# Patient Record
Sex: MALE | Race: WHITE | NOT HISPANIC OR LATINO | Employment: FULL TIME | URBAN - METROPOLITAN AREA
[De-identification: names, ages, dates, MRNs, and addresses within clinical notes are randomized per-mention and may not be internally consistent; named-entity substitution may affect disease eponyms.]

---

## 2022-08-16 ENCOUNTER — APPOINTMENT (OUTPATIENT)
Dept: CT IMAGING | Facility: CLINIC | Age: 57
End: 2022-08-16
Attending: FAMILY MEDICINE
Payer: COMMERCIAL

## 2022-08-16 ENCOUNTER — HOSPITAL ENCOUNTER (EMERGENCY)
Facility: CLINIC | Age: 57
Discharge: HOME | End: 2022-08-17
Attending: FAMILY MEDICINE | Admitting: EMERGENCY MEDICINE
Payer: COMMERCIAL

## 2022-08-16 DIAGNOSIS — R93.89 ABNORMAL CT SCAN: ICD-10-CM

## 2022-08-16 DIAGNOSIS — J69.0 ASPIRATION PNEUMONITIS (H): ICD-10-CM

## 2022-08-16 LAB
ALBUMIN SERPL-MCNC: 3.7 G/DL (ref 3.4–5)
ALBUMIN UR-MCNC: NEGATIVE MG/DL
ALP SERPL-CCNC: 77 U/L (ref 40–150)
ALT SERPL W P-5'-P-CCNC: 23 U/L (ref 0–70)
ANION GAP SERPL CALCULATED.3IONS-SCNC: 6 MMOL/L (ref 3–14)
APPEARANCE UR: CLEAR
AST SERPL W P-5'-P-CCNC: 21 U/L (ref 0–45)
BASE EXCESS BLDV CALC-SCNC: 0.7 MMOL/L (ref -7.7–1.9)
BASOPHILS # BLD AUTO: 0 10E3/UL (ref 0–0.2)
BASOPHILS NFR BLD AUTO: 0 %
BILIRUB SERPL-MCNC: 0.6 MG/DL (ref 0.2–1.3)
BILIRUB UR QL STRIP: NEGATIVE
BUN SERPL-MCNC: 14 MG/DL (ref 7–30)
CALCIUM SERPL-MCNC: 8.9 MG/DL (ref 8.5–10.1)
CHLORIDE BLD-SCNC: 108 MMOL/L (ref 94–109)
CO2 SERPL-SCNC: 25 MMOL/L (ref 20–32)
COLOR UR AUTO: YELLOW
CREAT SERPL-MCNC: 1.04 MG/DL (ref 0.66–1.25)
EOSINOPHIL # BLD AUTO: 0.1 10E3/UL (ref 0–0.7)
EOSINOPHIL NFR BLD AUTO: 1 %
ERYTHROCYTE [DISTWIDTH] IN BLOOD BY AUTOMATED COUNT: 16.5 % (ref 10–15)
FLUAV RNA SPEC QL NAA+PROBE: NEGATIVE
FLUBV RNA RESP QL NAA+PROBE: NEGATIVE
GFR SERPL CREATININE-BSD FRML MDRD: 83 ML/MIN/1.73M2
GLUCOSE BLD-MCNC: 105 MG/DL (ref 70–99)
GLUCOSE UR STRIP-MCNC: NEGATIVE MG/DL
HCO3 BLDV-SCNC: 24 MMOL/L (ref 21–28)
HCT VFR BLD AUTO: 39.8 % (ref 40–53)
HGB BLD-MCNC: 12.9 G/DL (ref 13.3–17.7)
HGB UR QL STRIP: NEGATIVE
IMM GRANULOCYTES # BLD: 0 10E3/UL
IMM GRANULOCYTES NFR BLD: 0 %
KETONES UR STRIP-MCNC: NEGATIVE MG/DL
LACTATE SERPL-SCNC: 1.7 MMOL/L (ref 0.7–2)
LEUKOCYTE ESTERASE UR QL STRIP: NEGATIVE
LYMPHOCYTES # BLD AUTO: 0.5 10E3/UL (ref 0.8–5.3)
LYMPHOCYTES NFR BLD AUTO: 6 %
MCH RBC QN AUTO: 28.1 PG (ref 26.5–33)
MCHC RBC AUTO-ENTMCNC: 32.4 G/DL (ref 31.5–36.5)
MCV RBC AUTO: 87 FL (ref 78–100)
MONOCYTES # BLD AUTO: 0.5 10E3/UL (ref 0–1.3)
MONOCYTES NFR BLD AUTO: 6 %
MUCOUS THREADS #/AREA URNS LPF: PRESENT /LPF
NEUTROPHILS # BLD AUTO: 7.2 10E3/UL (ref 1.6–8.3)
NEUTROPHILS NFR BLD AUTO: 87 %
NITRATE UR QL: NEGATIVE
NRBC # BLD AUTO: 0 10E3/UL
NRBC BLD AUTO-RTO: 0 /100
O2/TOTAL GAS SETTING VFR VENT: 21 %
PCO2 BLDV: 34 MM HG (ref 40–50)
PH BLDV: 7.46 [PH] (ref 7.32–7.43)
PH UR STRIP: 8 [PH] (ref 5–7)
PLATELET # BLD AUTO: 187 10E3/UL (ref 150–450)
PO2 BLDV: 48 MM HG (ref 25–47)
POTASSIUM BLD-SCNC: 3.8 MMOL/L (ref 3.4–5.3)
PROT SERPL-MCNC: 8 G/DL (ref 6.8–8.8)
RBC # BLD AUTO: 4.59 10E6/UL (ref 4.4–5.9)
RBC URINE: <1 /HPF
SARS-COV-2 RNA RESP QL NAA+PROBE: NEGATIVE
SODIUM SERPL-SCNC: 139 MMOL/L (ref 133–144)
SP GR UR STRIP: 1 (ref 1–1.03)
UROBILINOGEN UR STRIP-MCNC: NORMAL MG/DL
WBC # BLD AUTO: 8.3 10E3/UL (ref 4–11)
WBC URINE: <1 /HPF

## 2022-08-16 PROCEDURE — 99285 EMERGENCY DEPT VISIT HI MDM: CPT | Mod: CS,25 | Performed by: EMERGENCY MEDICINE

## 2022-08-16 PROCEDURE — 74177 CT ABD & PELVIS W/CONTRAST: CPT

## 2022-08-16 PROCEDURE — 87040 BLOOD CULTURE FOR BACTERIA: CPT | Performed by: FAMILY MEDICINE

## 2022-08-16 PROCEDURE — 80053 COMPREHEN METABOLIC PANEL: CPT | Performed by: FAMILY MEDICINE

## 2022-08-16 PROCEDURE — C9803 HOPD COVID-19 SPEC COLLECT: HCPCS | Performed by: EMERGENCY MEDICINE

## 2022-08-16 PROCEDURE — 96361 HYDRATE IV INFUSION ADD-ON: CPT | Performed by: EMERGENCY MEDICINE

## 2022-08-16 PROCEDURE — 82803 BLOOD GASES ANY COMBINATION: CPT | Performed by: FAMILY MEDICINE

## 2022-08-16 PROCEDURE — 250N000011 HC RX IP 250 OP 636: Performed by: FAMILY MEDICINE

## 2022-08-16 PROCEDURE — 81001 URINALYSIS AUTO W/SCOPE: CPT | Performed by: FAMILY MEDICINE

## 2022-08-16 PROCEDURE — 85025 COMPLETE CBC W/AUTO DIFF WBC: CPT | Performed by: FAMILY MEDICINE

## 2022-08-16 PROCEDURE — 258N000003 HC RX IP 258 OP 636: Performed by: FAMILY MEDICINE

## 2022-08-16 PROCEDURE — 96360 HYDRATION IV INFUSION INIT: CPT | Mod: 59 | Performed by: EMERGENCY MEDICINE

## 2022-08-16 PROCEDURE — 36415 COLL VENOUS BLD VENIPUNCTURE: CPT | Performed by: FAMILY MEDICINE

## 2022-08-16 PROCEDURE — 250N000009 HC RX 250: Performed by: FAMILY MEDICINE

## 2022-08-16 PROCEDURE — 250N000013 HC RX MED GY IP 250 OP 250 PS 637: Performed by: EMERGENCY MEDICINE

## 2022-08-16 PROCEDURE — 99285 EMERGENCY DEPT VISIT HI MDM: CPT | Mod: CS | Performed by: EMERGENCY MEDICINE

## 2022-08-16 PROCEDURE — 83605 ASSAY OF LACTIC ACID: CPT | Performed by: FAMILY MEDICINE

## 2022-08-16 PROCEDURE — 87086 URINE CULTURE/COLONY COUNT: CPT | Performed by: FAMILY MEDICINE

## 2022-08-16 PROCEDURE — 87636 SARSCOV2 & INF A&B AMP PRB: CPT | Performed by: FAMILY MEDICINE

## 2022-08-16 RX ORDER — IOPAMIDOL 755 MG/ML
79 INJECTION, SOLUTION INTRAVASCULAR ONCE
Status: COMPLETED | OUTPATIENT
Start: 2022-08-16 | End: 2022-08-16

## 2022-08-16 RX ORDER — ACETAMINOPHEN 325 MG/1
650 TABLET ORAL ONCE
Status: COMPLETED | OUTPATIENT
Start: 2022-08-16 | End: 2022-08-16

## 2022-08-16 RX ORDER — SODIUM CHLORIDE 9 MG/ML
INJECTION, SOLUTION INTRAVENOUS CONTINUOUS
Status: DISCONTINUED | OUTPATIENT
Start: 2022-08-16 | End: 2022-08-17 | Stop reason: HOSPADM

## 2022-08-16 RX ADMIN — SODIUM CHLORIDE 1000 ML: 9 INJECTION, SOLUTION INTRAVENOUS at 21:57

## 2022-08-16 RX ADMIN — IOPAMIDOL 79 ML: 755 INJECTION, SOLUTION INTRAVENOUS at 23:18

## 2022-08-16 RX ADMIN — ACETAMINOPHEN 650 MG: 325 TABLET, FILM COATED ORAL at 21:05

## 2022-08-16 RX ADMIN — SODIUM CHLORIDE 62 ML: 9 INJECTION, SOLUTION INTRAVENOUS at 23:18

## 2022-08-16 ASSESSMENT — ACTIVITIES OF DAILY LIVING (ADL): ADLS_ACUITY_SCORE: 35

## 2022-08-17 VITALS
RESPIRATION RATE: 28 BRPM | SYSTOLIC BLOOD PRESSURE: 104 MMHG | DIASTOLIC BLOOD PRESSURE: 74 MMHG | OXYGEN SATURATION: 94 % | WEIGHT: 180 LBS | BODY MASS INDEX: 25.2 KG/M2 | TEMPERATURE: 99.8 F | HEIGHT: 71 IN | HEART RATE: 92 BPM

## 2022-08-17 PROCEDURE — 250N000013 HC RX MED GY IP 250 OP 250 PS 637: Performed by: EMERGENCY MEDICINE

## 2022-08-17 RX ADMIN — AMOXICILLIN AND CLAVULANATE POTASSIUM 1 TABLET: 875; 125 TABLET, FILM COATED ORAL at 01:31

## 2022-08-17 ASSESSMENT — ACTIVITIES OF DAILY LIVING (ADL): ADLS_ACUITY_SCORE: 35

## 2022-08-17 NOTE — DISCHARGE INSTRUCTIONS
1) Your evaluation today revealed that you may have suffered aspiration pneumonia based on findings in the right lower lobe on CT imaging.  We discussed antibiotics given your fever and symptoms over the last 4 days.  You are placed on Augmentin to take twice a day for the next 5 days pending follow-up with your care team at [facility] in [place].  Follow-up imaging was recommended by radiologist after treatment to ensure that the findings in the right lower lung resolved.    2) We reviewed the role of pulmonary toilet including incentive spirometer.    3) you appear stable for discharge to home at this time if you develop new symptoms of concern he should return to be evaluated

## 2022-08-17 NOTE — ED PROVIDER NOTES
"  History     Chief Complaint   Patient presents with     Fever     Fever, chest tightness, concerned about aspiration pneumonia,      HPI  Yaakov Sol is a 57 year old male who comes in with a fever.  He has sweats and chills.  Has a little bit of a cough and a little bit of shortness of breath.  He has chronic abdominal pain that has not changed recently.  He is concerned that he aspirated.  He had an aspiration episode 3 nights ago during the night.  These occur not uncommonly since he had an esophagectomy and partial gastrectomy for esophageal cancer about 5 years ago.  This was treated at [facility].  2 years ago he had lung cancer.  Currently he is not taking any medications.  He does not have any history of other medical problems.  He does have a severe headache.  He does not have any dysuria or urinary frequency.  He has not had any change in his bowel habits.  He has had some tick bites but has not noticed any rashes.  He does not have any chest pain.    Allergies:  No Known Allergies    Problem List:    There are no problems to display for this patient.       Past Medical History:    No past medical history on file.    Past Surgical History:    No past surgical history on file.    Family History:    No family history on file.    Social History:  Marital Status:   [2]        Medications:    No current outpatient medications on file.        Review of Systems  All other systems are reviewed and are negative    Physical Exam   BP: (!) 135/90  Pulse: (!) 139  Temp: (!) 104.7  F (40.4  C)  Resp: 18  Height: 180.3 cm (5' 11\")  Weight: 81.6 kg (180 lb)  SpO2: 95 %      Physical Exam    Nursing note and vitals were reviewed.  Constitutional: Awake and alert, adequately nourished and developed appearing 57-year-old in no apparent discomfort, who appears moderately ill, and who answers questions appropriately and cooperates with examination.  HEENT: Atraumatic and normocephalic EOMI.   Neck: Freely " mobile.  No adenopathy, masses, meningismus.  Cardiovascular: Cardiac examination reveals normal heart rate and regular rhythm without murmur.  Pulmonary/Chest: Breathing is unlabored.  There are rales and rhonchi and wheezes in the right posterior inferior lung fields.  Abdomen: Soft, nontender, no HSM or masses rebound or guarding.  Musculoskeletal: Extremities are warm and well-perfused and without edema  Neurological: Alert, oriented, thought content logical, coherent   Skin: Warm, dry, no rashes.  Psychiatric: Affect broad and appropriate.    ED Course                 Procedures              Critical Care time:  none               Results for orders placed or performed during the hospital encounter of 08/16/22 (from the past 24 hour(s))   Symptomatic; Yes; 8/16/2022 Influenza A/B & SARS-CoV2 (COVID-19) Virus PCR Multiplex Nose    Specimen: Nose; Swab   Result Value Ref Range    Influenza A PCR Negative Negative    Influenza B PCR Negative Negative    SARS CoV2 PCR Negative Negative    Narrative    Testing was performed using the cordell SARS-CoV-2 & Influenza A/B Assay on the cordell M[employer] System. This test should be ordered for the detection of SARS-CoV-2 and influenza viruses in individuals who meet clinical and/or epidemiological criteria. Test performance is unknown in asymptomatic patients. This test is for in vitro diagnostic use under the FDA EUA for laboratories certified under CLIA to perform moderate and/or high complexity testing. This test has not been FDA cleared or approved. A negative result does not rule out the presence of PCR inhibitors in the specimen or target RNA in concentration below the limit of detection for the assay. If only one viral target is positive but coinfection with multiple targets is suspected, the sample should be re-tested with another FDA cleared, approved or authorized test, if coinfection would change clinical management. [facility] are certified under  the Clinical Laboratory Improvement Amendments of 1988 (CLIA-88) as  qualified to perform moderate and/or high complexity laboratory testing.   Comprehensive metabolic panel   Result Value Ref Range    Sodium 139 133 - 144 mmol/L    Potassium 3.8 3.4 - 5.3 mmol/L    Chloride 108 94 - 109 mmol/L    Carbon Dioxide (CO2) 25 20 - 32 mmol/L    Anion Gap 6 3 - 14 mmol/L    Urea Nitrogen 14 7 - 30 mg/dL    Creatinine 1.04 0.66 - 1.25 mg/dL    Calcium 8.9 8.5 - 10.1 mg/dL    Glucose 105 (H) 70 - 99 mg/dL    Alkaline Phosphatase 77 40 - 150 U/L    AST 21 0 - 45 U/L    ALT 23 0 - 70 U/L    Protein Total 8.0 6.8 - 8.8 g/dL    Albumin 3.7 3.4 - 5.0 g/dL    Bilirubin Total 0.6 0.2 - 1.3 mg/dL    GFR Estimate 83 >60 mL/min/1.73m2   CBC with platelets differential    Narrative    The following orders were created for panel order CBC with platelets differential.  Procedure                               Abnormality         Status                     ---------                               -----------         ------                     CBC with platelets and d...[[id number]]  Abnormal            Final result                 Please view results for these tests on the individual orders.   Lactic acid whole blood STAT   Result Value Ref Range    Lactic Acid 1.7 0.7 - 2.0 mmol/L   CBC with platelets and differential   Result Value Ref Range    WBC Count 8.3 4.0 - 11.0 10e3/uL    RBC Count 4.59 4.40 - 5.90 10e6/uL    Hemoglobin 12.9 (L) 13.3 - 17.7 g/dL    Hematocrit 39.8 (L) 40.0 - 53.0 %    MCV 87 78 - 100 fL    MCH 28.1 26.5 - 33.0 pg    MCHC 32.4 31.5 - 36.5 g/dL    RDW 16.5 (H) 10.0 - 15.0 %    Platelet Count 187 150 - 450 10e3/uL    % Neutrophils 87 %    % Lymphocytes 6 %    % Monocytes 6 %    % Eosinophils 1 %    % Basophils 0 %    % Immature Granulocytes 0 %    NRBCs per 100 WBC 0 <1 /100    Absolute Neutrophils 7.2 1.6 - 8.3 10e3/uL    Absolute Lymphocytes 0.5 (L) 0.8 - 5.3 10e3/uL    Absolute Monocytes 0.5 0.0 - 1.3 10e3/uL     Absolute Eosinophils 0.1 0.0 - 0.7 10e3/uL    Absolute Basophils 0.0 0.0 - 0.2 10e3/uL    Absolute Immature Granulocytes 0.0 <=0.4 10e3/uL    Absolute NRBCs 0.0 10e3/uL       Medications   0.9% sodium chloride BOLUS (1,000 mLs Intravenous New Bag 8/16/22 2861)     Followed by   sodium chloride 0.9% infusion (has no administration in time range)   acetaminophen (TYLENOL) tablet 650 mg (650 mg Oral Given 8/16/22 2105)       Assessments & Plan (with Medical Decision Making)     57-year-old male status post esophagectomy for esophageal cancer presented with fever of 104.7 and cough with concern for aspiration as described above.  He had rales in the right posterior inferior lung fields suggestive of a pneumonia in that location.  At shift change care was transitioned to Dr. Herman awaiting results of CT scan of the chest abdomen and pelvis to assess for the cause of his fever.  His laboratory evaluation was all normal and my highest suspicion is for aspiration pneumonitis.    I have reviewed the nursing notes.    I have reviewed the findings, diagnosis, plan and need for follow up with the patient.       New Prescriptions    No medications on file       Final diagnoses:   Aspiration pneumonitis (H)   Abnormal CT scan       8/16/2022   [facility] EMERGENCY DEPT     Yaakov Ramirez MD  08/17/22 1271

## 2022-08-17 NOTE — ED TRIAGE NOTES
Fever, chest tightness, concerned about aspiration pneumonia,      Triage Assessment     Row Name 08/16/22 2100       Triage Assessment (Adult)    Airway WDL WDL       Cardiac WDL    Cardiac WDL WDL       Cognitive/Neuro/Behavioral WDL    Cognitive/Neuro/Behavioral WDL WDL

## 2022-08-17 NOTE — ED PROVIDER NOTES
Emergency Department Patient Sign-out       Brief HPI:  This is a 57 year old male signed out to me by Dr.J Ramirez at shift change at 11pm . See Dr. SALVADOR Ramirez's ED note for additional details of care prior to shift change and handoff.  In summary by report at the time of shift change patient presented with fever and concern that he had aspirated 3 days prior.  Patient is reported to have a history of esophageal cancer and status post esophagectomy with pull-through and [facility] [place] [facility] years prior.  Prior local recurrence with pulmonary disease but no recent recurrence on surveillance follow-up.  History of aspiration.  Patient arrived with SIRS criteria.  Patient is awaiting CT chest abdomen pelvis imaging obtained for further evaluation including consideration of aspiration pneumonitis and/or pneumonia.  Previous episodes of aspiration and not being treated with antibiotics by report.  Patient arrived febrile, tachycardic but no oxygen requirement or respiratory distress.  Patient is noted to have a normal white count.  Lactate was 1.7.  Hemoglobin 12.9.  Cultures pending including blood, urinalysis.  Disposition will depend on CT findings and patient's course.         Significant Events after my assuming care:  CT chest/abdomen/pelvis revealed a multifocal nodular cough andinfiltrate in the right lower lobe.  Radiologist could not exclude infectious versus inflammatory nature or sequela of underlying disease recurrence.  Follow-up CT was recommended for interval resolution.  See additional details in the radiology report. Urinalysis negative for infection  Patient was seen and examined after pending urinalysis and CT interpretation.  Spoke with the patient and his spouse Malena at the bedside.  They live in [place].  Patient informed that he has had 2 prior episodes of aspiration but this was the worst episode.  He did confirm that he sleeps in a recliner and oftentimes slides down and  "this can result in aspiration especially if he eats late which he did 4 days prior.  He is on omeprazole daily.  He reports he is scheduled for surveillance follow-up exam [facility] in [place] in 48 hours.  We discussed the CT findings and risk and benefit of oral antibiotics concern for aspiration pneumonia in the right lower lobe.  Patient agreed to oral antibiotics and is placed on Augmentin twice daily x5 days pending follow-up with his care team at [facility] in [place].  He was offered an incentive spirometer for pulmonary toilet which he declined.    Exam:   Patient Vitals for the past 24 hrs:   BP Temp Temp src Pulse Resp SpO2 Height Weight   08/17/22 0105 -- 99.8  F (37.7  C) Oral -- -- 94 % -- --   08/17/22 0100 104/74 -- -- 92 -- -- -- --   08/16/22 2300 113/67 -- -- 105 28 94 % -- --   08/16/22 2230 121/72 -- -- 112 26 93 % -- --   08/16/22 2200 127/79 (!) 103.2  F (39.6  C) -- 114 28 91 % -- --   08/16/22 2130 (!) 142/86 -- -- (!) 129 8 91 % -- --   08/16/22 2100 (!) 135/90 (!) 104.7  F (40.4  C) Tympanic (!) 139 18 95 % 1.803 m (5' 11\") 81.6 kg (180 lb)           ED RESULTS:   Results for orders placed or performed during the hospital encounter of 08/16/22 (from the past 24 hour(s))   Symptomatic; Yes; 8/16/2022 Influenza A/B & SARS-CoV2 (COVID-19) Virus PCR Multiplex Nose     Status: Normal    Collection Time: 08/16/22  9:04 PM    Specimen: Nose; Swab   Result Value Ref Range    Influenza A PCR Negative Negative    Influenza B PCR Negative Negative    SARS CoV2 PCR Negative Negative    Narrative    Testing was performed using the cordell SARS-CoV-2 & Influenza A/B Assay on the cordell [employer] System. This test should be ordered for the detection of SARS-CoV-2 and influenza viruses in individuals who meet clinical and/or epidemiological criteria. Test performance is unknown in asymptomatic patients. This test is for in vitro diagnostic use under the FDA EUA for laboratories certified under CLIA to " perform moderate and/or high complexity testing. This test has not been FDA cleared or approved. A negative result does not rule out the presence of PCR inhibitors in the specimen or target RNA in concentration below the limit of detection for the assay. If only one viral target is positive but coinfection with multiple targets is suspected, the sample should be re-tested with another FDA cleared, approved or authorized test, if coinfection would change clinical management. [facility] [employer] are certified under the Clinical Laboratory Improvement Amendments of 1988 (CLIA-88) as  qualified to perform moderate and/or high complexity laboratory testing.   Comprehensive metabolic panel     Status: Abnormal    Collection Time: 08/16/22  9:28 PM   Result Value Ref Range    Sodium 139 133 - 144 mmol/L    Potassium 3.8 3.4 - 5.3 mmol/L    Chloride 108 94 - 109 mmol/L    Carbon Dioxide (CO2) 25 20 - 32 mmol/L    Anion Gap 6 3 - 14 mmol/L    Urea Nitrogen 14 7 - 30 mg/dL    Creatinine 1.04 0.66 - 1.25 mg/dL    Calcium 8.9 8.5 - 10.1 mg/dL    Glucose 105 (H) 70 - 99 mg/dL    Alkaline Phosphatase 77 40 - 150 U/L    AST 21 0 - 45 U/L    ALT 23 0 - 70 U/L    Protein Total 8.0 6.8 - 8.8 g/dL    Albumin 3.7 3.4 - 5.0 g/dL    Bilirubin Total 0.6 0.2 - 1.3 mg/dL    GFR Estimate 83 >60 mL/min/1.73m2   CBC with platelets differential     Status: Abnormal    Collection Time: 08/16/22  9:28 PM    Narrative    The following orders were created for panel order CBC with platelets differential.  Procedure                               Abnormality         Status                     ---------                               -----------         ------                     CBC with platelets and d...[[id number]]  Abnormal            Final result                 Please view results for these tests on the individual orders.   Lactic acid whole blood STAT     Status: Normal    Collection Time: 08/16/22  9:28 PM   Result Value Ref Range     Lactic Acid 1.7 0.7 - 2.0 mmol/L   CBC with platelets and differential     Status: Abnormal    Collection Time: 08/16/22  9:28 PM   Result Value Ref Range    WBC Count 8.3 4.0 - 11.0 10e3/uL    RBC Count 4.59 4.40 - 5.90 10e6/uL    Hemoglobin 12.9 (L) 13.3 - 17.7 g/dL    Hematocrit 39.8 (L) 40.0 - 53.0 %    MCV 87 78 - 100 fL    MCH 28.1 26.5 - 33.0 pg    MCHC 32.4 31.5 - 36.5 g/dL    RDW 16.5 (H) 10.0 - 15.0 %    Platelet Count 187 150 - 450 10e3/uL    % Neutrophils 87 %    % Lymphocytes 6 %    % Monocytes 6 %    % Eosinophils 1 %    % Basophils 0 %    % Immature Granulocytes 0 %    NRBCs per 100 WBC 0 <1 /100    Absolute Neutrophils 7.2 1.6 - 8.3 10e3/uL    Absolute Lymphocytes 0.5 (L) 0.8 - 5.3 10e3/uL    Absolute Monocytes 0.5 0.0 - 1.3 10e3/uL    Absolute Eosinophils 0.1 0.0 - 0.7 10e3/uL    Absolute Basophils 0.0 0.0 - 0.2 10e3/uL    Absolute Immature Granulocytes 0.0 <=0.4 10e3/uL    Absolute NRBCs 0.0 10e3/uL   Blood gas venous     Status: Abnormal    Collection Time: 08/16/22 10:13 PM   Result Value Ref Range    pH Venous 7.46 (H) 7.32 - 7.43    pCO2 Venous 34 (L) 40 - 50 mm Hg    pO2 Venous 48 (H) 25 - 47 mm Hg    Bicarbonate Venous 24 21 - 28 mmol/L    Base Excess/Deficit (+/-) 0.7 -7.7 - 1.9 mmol/L    FIO2 21    UA reflex to Microscopic     Status: Abnormal    Collection Time: 08/16/22 11:19 PM   Result Value Ref Range    Color Urine Yellow Colorless, Straw, Light Yellow, Yellow    Appearance Urine Clear Clear    Glucose Urine Negative Negative mg/dL    Bilirubin Urine Negative Negative    Ketones Urine Negative Negative mg/dL    Specific Gravity Urine 1.005 1.003 - 1.035    Blood Urine Negative Negative    pH Urine 8.0 (H) 5.0 - 7.0    Protein Albumin Urine Negative Negative mg/dL    Urobilinogen Urine Normal Normal, 2.0 mg/dL    Nitrite Urine Negative Negative    Leukocyte Esterase Urine Negative Negative    RBC Urine <1 <=2 /HPF    WBC Urine <1 <=5 /HPF    Mucus Urine Present (A) None Seen /LPF     Narrative    Microscopic not indicated   CT Chest/Abdomen/Pelvis w Contrast     Status: None    Collection Time: 08/16/22 11:36 PM    Narrative    EXAM: CT CHEST/ABDOMEN/PELVIS W CONTRAST  LOCATION: [facility]  DATE/TIME: 8/16/2022 11:17 PM    INDICATION: sepsis  COMPARISON: None.  TECHNIQUE: CT scan of the chest, abdomen, and pelvis was performed following injection of IV contrast. Multiplanar reformats were obtained. Dose reduction techniques were used.   CONTRAST: 79 ml Isovue 370    FINDINGS:   LUNGS AND PLEURA: Evidence of prior right upper lobe resection. Patchy nodular opacities throughout the right lower lobe which are more nodular posteriorly (image 154 series 4) and of more confluent airspace opacification in the basal segments (image 191   and image 231 of series 4). There is some pleural thickening and calcification posteriorly at the right lung base with a tiny amount of pleural fluid. The left lung is clear.    MEDIASTINUM/AXILLAE: Heart size is normal. Status post esophagectomy and gastric pull-through. Aberrant right subclavian arterial anatomy. No pericardial effusion. Small coronary artery calcifications.    CORONARY ARTERY CALCIFICATION: Mild.    HEPATOBILIARY: Small gallstones. Normal liver.    PANCREAS: Normal.    SPLEEN: Normal.    ADRENAL GLANDS: Normal.    KIDNEYS/BLADDER: Normal kidneys. Bladder is nearly empty.    BOWEL: Normal appendix. No mechanical bowel obstruction or free air. Distal colonic diverticulosis.    LYMPH NODES: Normal.    VASCULATURE: Unremarkable.    PELVIC ORGANS: Prostatic parenchymal calcifications.    MUSCULOSKELETAL: L5-S1 degenerative disc height loss. No suspicious osseous lesions.      Impression    IMPRESSION:  1.  Multifocal nodular and confluent airspace infiltrates in the right lower lobe. Given history of sepsis these are likely infectious or inflammatory in nature. Sequela of underlying disease recurrence is less likely though  difficult to fully exclude in   absence of comparison imaging. Recommend short-term CT follow-up to ensure resolution.    2.  Cholelithiasis.    3.  Colonic diverticulosis.       ED MEDICATIONS:   Medications   0.9% sodium chloride BOLUS (0 mLs Intravenous Stopped 8/17/22 0128)     Followed by   sodium chloride 0.9% infusion (has no administration in time range)   amoxicillin-clavulanate (AUGMENTIN) 875-125 MG per tablet 1 tablet (has no administration in time range)   acetaminophen (TYLENOL) tablet 650 mg (650 mg Oral Given 8/16/22 2105)   iopamidol (ISOVUE-370) solution 79 mL (79 mLs Intravenous Given 8/16/22 2318)   sodium chloride 0.9 % bag 500mL for CT scan flush use (62 mLs As instructed Given 8/16/22 2318)         Impression:    ICD-10-CM    1. Aspiration pneumonitis (H)  J69.0    2. Abnormal CT scan  R93.89        Plan:    Discharge with oral Augmentin twice daily x5 days for concern for aspiration pneumonia after aspiration event 4 days ago with history of esophageal cancer status post esophagectomy with pull-through 5 years.  Keep pending appointment with care team at [facility] in [place] in 48 hours.      MD Batsheva Peñaloza Ebenezer Tope, MD  08/17/22 0128

## 2022-08-17 NOTE — ED NOTES
Pt came in with fever of 104.1 that started at approximately 1630.  Patient stated he aspirated food on Saturday.  Lung sounds clear, patient has occasional cough.

## 2022-08-18 LAB — BACTERIA UR CULT: NO GROWTH

## 2022-08-18 NOTE — RESULT ENCOUNTER NOTE
"Final urine culture report shows \"NO GROWTH\" and is NEGATIVE.  [facility] Emergency Dept discharge antibiotic: Augmentin (pneumonia)  Recommendations in treatment per [facility] ED Lab result Urine culture protocol.  "

## 2022-08-22 LAB
BACTERIA BLD CULT: NO GROWTH
BACTERIA BLD CULT: NO GROWTH